# Patient Record
Sex: MALE | Race: WHITE | NOT HISPANIC OR LATINO
[De-identification: names, ages, dates, MRNs, and addresses within clinical notes are randomized per-mention and may not be internally consistent; named-entity substitution may affect disease eponyms.]

---

## 2020-08-22 ENCOUNTER — TRANSCRIPTION ENCOUNTER (OUTPATIENT)
Age: 65
End: 2020-08-22

## 2020-12-31 ENCOUNTER — TRANSCRIPTION ENCOUNTER (OUTPATIENT)
Age: 65
End: 2020-12-31

## 2021-01-08 ENCOUNTER — TRANSCRIPTION ENCOUNTER (OUTPATIENT)
Age: 66
End: 2021-01-08

## 2021-08-24 ENCOUNTER — INPATIENT (INPATIENT)
Facility: HOSPITAL | Age: 66
LOS: 0 days | Discharge: HOME | End: 2021-08-25
Attending: STUDENT IN AN ORGANIZED HEALTH CARE EDUCATION/TRAINING PROGRAM | Admitting: STUDENT IN AN ORGANIZED HEALTH CARE EDUCATION/TRAINING PROGRAM
Payer: MEDICAID

## 2021-08-24 VITALS
HEART RATE: 69 BPM | OXYGEN SATURATION: 99 % | DIASTOLIC BLOOD PRESSURE: 114 MMHG | RESPIRATION RATE: 18 BRPM | SYSTOLIC BLOOD PRESSURE: 183 MMHG | TEMPERATURE: 98 F

## 2021-08-24 DIAGNOSIS — N13.2 HYDRONEPHROSIS WITH RENAL AND URETERAL CALCULOUS OBSTRUCTION: ICD-10-CM

## 2021-08-24 LAB
ALBUMIN SERPL ELPH-MCNC: 4.7 G/DL — SIGNIFICANT CHANGE UP (ref 3.5–5.2)
ALP SERPL-CCNC: 115 U/L — SIGNIFICANT CHANGE UP (ref 30–115)
ALT FLD-CCNC: 19 U/L — SIGNIFICANT CHANGE UP (ref 0–41)
ANION GAP SERPL CALC-SCNC: 9 MMOL/L — SIGNIFICANT CHANGE UP (ref 7–14)
APPEARANCE UR: ABNORMAL
AST SERPL-CCNC: 20 U/L — SIGNIFICANT CHANGE UP (ref 0–41)
BACTERIA # UR AUTO: NEGATIVE — SIGNIFICANT CHANGE UP
BASOPHILS # BLD AUTO: 0.02 K/UL — SIGNIFICANT CHANGE UP (ref 0–0.2)
BASOPHILS NFR BLD AUTO: 0.3 % — SIGNIFICANT CHANGE UP (ref 0–1)
BILIRUB DIRECT SERPL-MCNC: <0.2 MG/DL — SIGNIFICANT CHANGE UP (ref 0–0.2)
BILIRUB INDIRECT FLD-MCNC: >0.1 MG/DL — LOW (ref 0.2–1.2)
BILIRUB SERPL-MCNC: 0.3 MG/DL — SIGNIFICANT CHANGE UP (ref 0.2–1.2)
BILIRUB UR-MCNC: NEGATIVE — SIGNIFICANT CHANGE UP
BUN SERPL-MCNC: 24 MG/DL — HIGH (ref 10–20)
CALCIUM SERPL-MCNC: 9.7 MG/DL — SIGNIFICANT CHANGE UP (ref 8.5–10.1)
CHLORIDE SERPL-SCNC: 106 MMOL/L — SIGNIFICANT CHANGE UP (ref 98–110)
CO2 SERPL-SCNC: 28 MMOL/L — SIGNIFICANT CHANGE UP (ref 17–32)
COLOR SPEC: YELLOW — SIGNIFICANT CHANGE UP
CREAT SERPL-MCNC: 1.1 MG/DL — SIGNIFICANT CHANGE UP (ref 0.7–1.5)
DIFF PNL FLD: ABNORMAL
EOSINOPHIL # BLD AUTO: 0.02 K/UL — SIGNIFICANT CHANGE UP (ref 0–0.7)
EOSINOPHIL NFR BLD AUTO: 0.3 % — SIGNIFICANT CHANGE UP (ref 0–8)
EPI CELLS # UR: 0 /HPF — SIGNIFICANT CHANGE UP (ref 0–5)
GLUCOSE SERPL-MCNC: 118 MG/DL — HIGH (ref 70–99)
GLUCOSE UR QL: NEGATIVE — SIGNIFICANT CHANGE UP
HCT VFR BLD CALC: 43.2 % — SIGNIFICANT CHANGE UP (ref 42–52)
HGB BLD-MCNC: 14.1 G/DL — SIGNIFICANT CHANGE UP (ref 14–18)
HYALINE CASTS # UR AUTO: 0 /LPF — SIGNIFICANT CHANGE UP (ref 0–7)
IMM GRANULOCYTES NFR BLD AUTO: 0.3 % — SIGNIFICANT CHANGE UP (ref 0.1–0.3)
KETONES UR-MCNC: NEGATIVE — SIGNIFICANT CHANGE UP
LACTATE SERPL-SCNC: 1.4 MMOL/L — SIGNIFICANT CHANGE UP (ref 0.7–2)
LEUKOCYTE ESTERASE UR-ACNC: NEGATIVE — SIGNIFICANT CHANGE UP
LIDOCAIN IGE QN: 31 U/L — SIGNIFICANT CHANGE UP (ref 7–60)
LYMPHOCYTES # BLD AUTO: 1.24 K/UL — SIGNIFICANT CHANGE UP (ref 1.2–3.4)
LYMPHOCYTES # BLD AUTO: 17.4 % — LOW (ref 20.5–51.1)
MCHC RBC-ENTMCNC: 29.7 PG — SIGNIFICANT CHANGE UP (ref 27–31)
MCHC RBC-ENTMCNC: 32.6 G/DL — SIGNIFICANT CHANGE UP (ref 32–37)
MCV RBC AUTO: 90.9 FL — SIGNIFICANT CHANGE UP (ref 80–94)
MONOCYTES # BLD AUTO: 0.39 K/UL — SIGNIFICANT CHANGE UP (ref 0.1–0.6)
MONOCYTES NFR BLD AUTO: 5.5 % — SIGNIFICANT CHANGE UP (ref 1.7–9.3)
NEUTROPHILS # BLD AUTO: 5.44 K/UL — SIGNIFICANT CHANGE UP (ref 1.4–6.5)
NEUTROPHILS NFR BLD AUTO: 76.2 % — HIGH (ref 42.2–75.2)
NITRITE UR-MCNC: NEGATIVE — SIGNIFICANT CHANGE UP
NRBC # BLD: 0 /100 WBCS — SIGNIFICANT CHANGE UP (ref 0–0)
PH UR: 7.5 — SIGNIFICANT CHANGE UP (ref 5–8)
PLATELET # BLD AUTO: 166 K/UL — SIGNIFICANT CHANGE UP (ref 130–400)
POTASSIUM SERPL-MCNC: 4.2 MMOL/L — SIGNIFICANT CHANGE UP (ref 3.5–5)
POTASSIUM SERPL-SCNC: 4.2 MMOL/L — SIGNIFICANT CHANGE UP (ref 3.5–5)
PROT SERPL-MCNC: 7.4 G/DL — SIGNIFICANT CHANGE UP (ref 6–8)
PROT UR-MCNC: SIGNIFICANT CHANGE UP
RBC # BLD: 4.75 M/UL — SIGNIFICANT CHANGE UP (ref 4.7–6.1)
RBC # FLD: 12.7 % — SIGNIFICANT CHANGE UP (ref 11.5–14.5)
RBC CASTS # UR COMP ASSIST: >720 /HPF — HIGH (ref 0–4)
SODIUM SERPL-SCNC: 143 MMOL/L — SIGNIFICANT CHANGE UP (ref 135–146)
SP GR SPEC: 1.02 — SIGNIFICANT CHANGE UP (ref 1.01–1.03)
UROBILINOGEN FLD QL: SIGNIFICANT CHANGE UP
WBC # BLD: 7.13 K/UL — SIGNIFICANT CHANGE UP (ref 4.8–10.8)
WBC # FLD AUTO: 7.13 K/UL — SIGNIFICANT CHANGE UP (ref 4.8–10.8)
WBC UR QL: 4 /HPF — SIGNIFICANT CHANGE UP (ref 0–5)

## 2021-08-24 PROCEDURE — 74177 CT ABD & PELVIS W/CONTRAST: CPT | Mod: 26,MA

## 2021-08-24 PROCEDURE — 99285 EMERGENCY DEPT VISIT HI MDM: CPT

## 2021-08-24 RX ORDER — MORPHINE SULFATE 50 MG/1
2 CAPSULE, EXTENDED RELEASE ORAL ONCE
Refills: 0 | Status: DISCONTINUED | OUTPATIENT
Start: 2021-08-24 | End: 2021-08-24

## 2021-08-24 RX ORDER — SODIUM CHLORIDE 9 MG/ML
1000 INJECTION INTRAMUSCULAR; INTRAVENOUS; SUBCUTANEOUS ONCE
Refills: 0 | Status: COMPLETED | OUTPATIENT
Start: 2021-08-24 | End: 2021-08-24

## 2021-08-24 RX ORDER — TAMSULOSIN HYDROCHLORIDE 0.4 MG/1
0.4 CAPSULE ORAL ONCE
Refills: 0 | Status: COMPLETED | OUTPATIENT
Start: 2021-08-24 | End: 2021-08-24

## 2021-08-24 RX ORDER — KETOROLAC TROMETHAMINE 30 MG/ML
15 SYRINGE (ML) INJECTION ONCE
Refills: 0 | Status: DISCONTINUED | OUTPATIENT
Start: 2021-08-24 | End: 2021-08-24

## 2021-08-24 RX ORDER — SODIUM CHLORIDE 9 MG/ML
1000 INJECTION, SOLUTION INTRAVENOUS ONCE
Refills: 0 | Status: COMPLETED | OUTPATIENT
Start: 2021-08-24 | End: 2021-08-24

## 2021-08-24 RX ORDER — TAMSULOSIN HYDROCHLORIDE 0.4 MG/1
0.4 CAPSULE ORAL AT BEDTIME
Refills: 0 | Status: DISCONTINUED | OUTPATIENT
Start: 2021-08-24 | End: 2021-08-25

## 2021-08-24 RX ORDER — ONDANSETRON 8 MG/1
4 TABLET, FILM COATED ORAL ONCE
Refills: 0 | Status: COMPLETED | OUTPATIENT
Start: 2021-08-24 | End: 2021-08-24

## 2021-08-24 RX ORDER — MORPHINE SULFATE 50 MG/1
4 CAPSULE, EXTENDED RELEASE ORAL ONCE
Refills: 0 | Status: DISCONTINUED | OUTPATIENT
Start: 2021-08-24 | End: 2021-08-24

## 2021-08-24 RX ADMIN — ONDANSETRON 4 MILLIGRAM(S): 8 TABLET, FILM COATED ORAL at 18:18

## 2021-08-24 RX ADMIN — SODIUM CHLORIDE 1000 MILLILITER(S): 9 INJECTION INTRAMUSCULAR; INTRAVENOUS; SUBCUTANEOUS at 22:33

## 2021-08-24 RX ADMIN — MORPHINE SULFATE 4 MILLIGRAM(S): 50 CAPSULE, EXTENDED RELEASE ORAL at 18:18

## 2021-08-24 RX ADMIN — Medication 15 MILLIGRAM(S): at 20:57

## 2021-08-24 RX ADMIN — SODIUM CHLORIDE 1000 MILLILITER(S): 9 INJECTION, SOLUTION INTRAVENOUS at 18:17

## 2021-08-24 RX ADMIN — TAMSULOSIN HYDROCHLORIDE 0.4 MILLIGRAM(S): 0.4 CAPSULE ORAL at 22:33

## 2021-08-24 NOTE — ED PROVIDER NOTE - CLINICAL SUMMARY MEDICAL DECISION MAKING FREE TEXT BOX
67 yo male with PMH of HTN presents to the ER for left flank pain x 4 days. History from son Jake Gonzales (translating for pt). Pt states pain radiates to the LLQ, and is associated with nausea/vomiting, urinary urgency. Had an outpatient XRAY done which showed possible kidney stone on the left. Pt therefore came in with family for evaluation of kidney stone. Pt states no diarrhea/cp/sob/fever/rash/cough/sneezing. On exam ncat, perrl, eomi, lung ctab, heart regular s1s2 , abdomen soft nd, +LLQ tenderness, +left flank tenderness, +BS, no mass, Ext no edema or calf tenderness, neuro intact. Given IVFs, and pain meds. Labs, urine and CT checked. Found to have large blood in urine and CT showed 5x8 mm mid ureteral calculus, moderate hydro. Consulted Urology given large size of stone and intractable pain. Urology recommends admission to medicine for clearance, and likely procedure for tomorrow.

## 2021-08-24 NOTE — ED PROVIDER NOTE - PHYSICAL EXAMINATION
CONSTITUTIONAL: Well-developed; well-nourished; in no acute distress, nontoxic appearing  SKIN: skin exam is warm and dry  ENT: MMM  NECK: ROM intact.  CARD: S1, S2 normal, no murmur  RESP: No wheezes, rales or rhonchi. Good air movement bilaterally  ABD: +LLQ, no rebound/guarding. No CVAT   EXT: Normal ROM.   NEURO: awake, alert, following commands, oriented, grossly unremarkable. No Focal deficits. GCS 15.   PSYCH: Cooperative, appropriate.

## 2021-08-24 NOTE — ED PROVIDER NOTE - OBJECTIVE STATEMENT
66 year old male, past medical history htn, kidney stones, who presents with flank pain. patient reports intermittent l sided flank pain radiating to LLQ x3 days, followed up outpatient at pmd, had XR abdomen concerning for stone sent to ed for further eval. reports nausea/vomiting, urinary urgency. denies fever, chills, chest pain, SOB, bowel changes. no hx abd surgeries.

## 2021-08-24 NOTE — ED ADULT NURSE NOTE - OBJECTIVE STATEMENT
Patient present to ED with complains of left flank pain x 3 days. Patient has hx of kidney stones a long time ago as per son. Denies fevers chills, vomiting, diarrhea, chest pain and SOB.

## 2021-08-24 NOTE — ED ADULT NURSE NOTE - BREATHING, MLM
Spontaneous, unlabored and symmetrical Island Pedicle Flap Text: The defect edges were debeveled with a #15 scalpel blade.  Given the location of the defect, shape of the defect and the proximity to free margins an island pedicle advancement flap was deemed most appropriate.  Using a sterile surgical marker, an appropriate advancement flap was drawn incorporating the defect, outlining the appropriate donor tissue and placing the expected incisions within the relaxed skin tension lines where possible.    The area thus outlined was incised deep to adipose tissue with a #15 scalpel blade.  The skin margins were undermined to an appropriate distance in all directions around the primary defect and laterally outward around the island pedicle utilizing iris scissors.  There was minimal undermining beneath the pedicle flap.

## 2021-08-24 NOTE — ED PROVIDER NOTE - NS ED ROS FT
Review of Systems:  	•	CONSTITUTIONAL: no fever  	•	SKIN: no rash  	•	ENT: no sore throat  	•	RESPIRATORY: no shortness of breath, no cough  	•	CARDIAC: no chest pain, no palpitations  	•	GI: +L flank pain, +nausea/vomiting. no bowel changes  	•	GENITO-URINARY: no discharge, no dysuria; no hematuria, +urgency   	•	MUSCULOSKELETAL: no joint paint, no swelling, no redness  	•	NEUROLOGIC: no weakness, no headache  	•	PSYCH: no anxiety, non suicidal, non homicidal, no hallucination, no depression

## 2021-08-24 NOTE — ED PROVIDER NOTE - PROGRESS NOTE DETAILS
Maritza: seen by Urology team. The patient willing to stay overnight for urology procedure to be done tomorrow to assist with intractable pain from large mid ureter stone. Pt to be admitted to medicine for clearance prior to procedure.

## 2021-08-24 NOTE — CONSULT NOTE ADULT - PROBLEM SELECTOR RECOMMENDATION 9
Pt. and wife at bedside, was informed of all his treatment options, either conservative tx with analgesia/flomax or surgical intervention via cystoscopy, laser and stent placement.  Pt states he prefers to have procedure performed since pain will likely return.  Spoke with Urology Attending who will place on OR schedule for tomorrow if pt is medically cleared and ready to go to the OR.  ED Attending aware and will admit to medical svc    NPO after midnight tonight  Medical clearance and optimization for Cystoscopy, ureteroscopy and laser with JJ stent tomorrow as an add on  Continue IV hydration  Strain all urine  Send urine for Culture and Sensitivity  Flomax  Analgesia  Will Book for OR tomorrow as Add on

## 2021-08-24 NOTE — CONSULT NOTE ADULT - ATTENDING COMMENTS
Pt seen and examined 8/25/21 in the afternoon, ct ap images visualized demonstrating left hydroureteronephrosis  to the level of an 8 mm ureteral stone. pt has felt well all day and we discussed options with the patient. since he is feeling better he would prefer trial of passage. understands that if he has f/c he needs to return immediately. he knows its a larger stone and may not pass it and require additional procedure. though it is reasonable to have a trial of passage as sx improved, and he has passed stones in the past.  flomax   toradol  appt already made w my office

## 2021-08-24 NOTE — ED PROVIDER NOTE - ATTENDING CONTRIBUTION TO CARE
67 yo male with PMH of HTN presents to the ER for left flank pain x 4 days. History from son Jake Gonzales (translating for pt). Pt states pain radiates to the LLQ, and is associated with nausea/vomiting, urinary urgency. Had an outpatient XRAY done which showed possible kidney stone on the left. Pt therefore came in with family for evaluation of kidney stone. Pt states no diarrhea/cp/sob/fever/rash/cough/sneezing. On exam ncat, perrl, eomi, lung ctab, heart regular s1s2 , abdomen soft nd, +LLQ tenderness, +left flank tenderness, +BS, no mass, Ext no edema or calf tenderness, neuro intact. Given IVFs, and pain meds. Labs, urine and CT checked. Found to have large blood in urine and CT showed 5x8 mm mid ureteral calculus, moderate hydro. Consulted Urology given large size of stone and intractable pain. Urology recommends admission to medicine for clearance, and likely procedure for tomorrow.     ALL: nkda  PMH as above  Meds Exforge 160-5, no blood thinners  SH no smoker, +etoh occ  PMD denies  Covid vaccinated with 2 shot brand  swab neg yesterday for covid

## 2021-08-24 NOTE — CONSULT NOTE ADULT - SUBJECTIVE AND OBJECTIVE BOX
Pt is 67 yo male presents with 3 days hx of left sided flank pain assosciated with nausea and vomit x 1.  Pt saw his PMD yesterday and was given pain medication and a script for xray of abdomen which pt did not have opportunity to complete.  Pt states pain was 10/10 but now after getting Toradol and morphine it is tolerable.  Pt denies any fever, chills or dysuria.  Pt states he had episode of kidney stones 30 yrs ago in [place] in which they apparently passed on their own.      PAST MEDICAL HX    Hypertension    MEDS:  Losartan    SOCIAL HX: Denies any illicit drug use    NKDA      REVIEW OF SYSTEMS:    CONSTITUTIONAL:  fevers or chills  HEENT: No visual changes  ENDO: No sweating  NECK: No pain or stiffness  MUSCULOSKELETAL: No back pain, no joint pain  RESPIRATORY: No shortness of breath  CARDIOVASCULAR: No chest pain  GASTROINTESTINAL: No abdominal or epigastric pain. No nausea, vomiting,  No diarrhea or constipation.   NEUROLOGICAL: No mental status changes  PSYCH: No depression, no mood changes  SKIN: No itching      MEDICATIONS  (STANDING):  sodium chloride 0.9% Bolus 1000 milliLiter(s) IV Bolus once  tamsulosin 0.4 milliGRAM(s) Oral at bedtime  tamsulosin 0.4 milliGRAM(s) Oral once                    Vital Signs Last 24 Hrs  T(C): 36.5 (24 Aug 2021 17:44), Max: 36.5 (24 Aug 2021 17:44)  T(F): 97.7 (24 Aug 2021 17:44), Max: 97.7 (24 Aug 2021 17:44)  HR: 69 (24 Aug 2021 17:44) (69 - 69)  BP: 183/114 (24 Aug 2021 17:44) (183/114 - 183/114)  BP(mean): --  RR: 18 (24 Aug 2021 17:44) (18 - 18)  SpO2: 99% (24 Aug 2021 17:44) (99% - 99%)    PHYSICAL EXAM:    Constitutional: NAD, well-developed, pain controlled at this time  HEENT: EOMI  Neck: no pain  Back: Mild Left sided CVA tenderness  Respiratory: No accessory respiratory muscle use  Abd: Soft, NT/ND  no organomegally  no hernia  : No suprapubic fullness or tenderness, no scrotal edema or tenderness  Extremities: no edema  Neurological: A/O x 3  Psychiatric: Normal mood, normal affect  Skin: No rashes      LABS:                        14.1   7.13  )-----------( 166      ( 24 Aug 2021 18:21 )             43.2         143  |  106  |  24<H>  ----------------------------<  118<H>  4.2   |  28  |  1.1    Ca    9.7      24 Aug 2021 18:21    TPro  7.4  /  Alb  4.7  /  TBili  0.3  /  DBili  <0.2  /  AST  20  /  ALT  19  /  AlkPhos  115        Urinalysis Basic - ( 24 Aug 2021 18:33 )    Color: Yellow / Appearance: Slightly Turbid / S.019 / pH: x  Gluc: x / Ketone: Negative  / Bili: Negative / Urobili: <2 mg/dL   Blood: x / Protein: Trace / Nitrite: Negative   Leuk Esterase: Negative / RBC: >720 /HPF / WBC 4 /HPF   Sq Epi: x / Non Sq Epi: 0 /HPF / Bacteria: Negative            RADIOLOGY & ADDITIONAL STUDIES:  < from: CT Abdomen and Pelvis w/ IV Cont (21 @ 21:07) >    EXAM:  CT ABDOMEN AND PELVIS IC            PROCEDURE DATE:  2021            INTERPRETATION:  CLINICAL STATEMENT: Left flank pain, nausea and vomiting.    TECHNIQUE: Contiguous axial CT images were obtained from the lower chest to the pubic symphysis after the administration of 100 cc Omnipaque 350 IV contrast.  Oral contrast was not administered.  Reformatted images in the coronal and sagittal planes were acquired.    COMPARISON CT: None.    FINDINGS:    LOWER CHEST: Bibasilar atelectaticchanges.    HEPATOBILIARY: Unremarkable.    SPLEEN: Unremarkable.    PANCREAS: Unremarkable.    ADRENAL GLANDS: Thickening of the left adrenal gland. Unremarkable right adrenal gland..    KIDNEYS: Delayed left nephrogram with moderate hydroureteronephrosis and perinephric/periureteral fat stranding to the level of an obstructing mid ureteral calculus measuring approximately 5 x 5 x 8 mm and 1260 HU. Partial duplication of the right collecting system. No right-sided hydronephrosis.    ABDOMINOPELVIC NODES: No enlarged abdominal or pelvic lymph nodes.    PELVIC ORGANS: Mild prostatomegaly.    PERITONEUM/MESENTERY/BOWEL: No intraperitoneal free air, ascites, or bowel obstruction. Normal appendix.    BONES/SOFT TISSUES: Benign-appearing bone islands, the largest within the left sacrum (4-2 80). No aggressive osseous lesions or destructive soft tissue processes.    OTHER: Minimal atherosclerosis of the aorta. Origins of the abdominal aortic branches are patent.    IMPRESSION:    Delayed left nephrogram with moderate hydroureteronephrosis and perinephric/periureteral fat stranding to the level of an obstructing mid ureteral calculus measuring approximately 5 x 5 x 8 mm and 1260 HU.    Nonspecific thickening of the left adrenal gland.    ---End of Report ---            JANY KIM MD; Resident Radiologist  This document has been electronically signed.  CAROLE WILLAMS MD; Attending Interventional Radiologist  This document has been electronically signed. Aug 24 2021 10:00PM    < end of copied text >

## 2021-08-25 ENCOUNTER — TRANSCRIPTION ENCOUNTER (OUTPATIENT)
Age: 66
End: 2021-08-25

## 2021-08-25 VITALS
TEMPERATURE: 97 F | DIASTOLIC BLOOD PRESSURE: 71 MMHG | RESPIRATION RATE: 19 BRPM | SYSTOLIC BLOOD PRESSURE: 144 MMHG | HEART RATE: 69 BPM

## 2021-08-25 LAB
ABO RH CONFIRMATION: SIGNIFICANT CHANGE UP
ALBUMIN SERPL ELPH-MCNC: 4.2 G/DL — SIGNIFICANT CHANGE UP (ref 3.5–5.2)
ALP SERPL-CCNC: 106 U/L — SIGNIFICANT CHANGE UP (ref 30–115)
ALT FLD-CCNC: 15 U/L — SIGNIFICANT CHANGE UP (ref 0–41)
ANION GAP SERPL CALC-SCNC: 9 MMOL/L — SIGNIFICANT CHANGE UP (ref 7–14)
APTT BLD: 32.3 SEC — SIGNIFICANT CHANGE UP (ref 27–39.2)
AST SERPL-CCNC: 19 U/L — SIGNIFICANT CHANGE UP (ref 0–41)
BASOPHILS # BLD AUTO: 0.02 K/UL — SIGNIFICANT CHANGE UP (ref 0–0.2)
BASOPHILS NFR BLD AUTO: 0.3 % — SIGNIFICANT CHANGE UP (ref 0–1)
BILIRUB SERPL-MCNC: 0.4 MG/DL — SIGNIFICANT CHANGE UP (ref 0.2–1.2)
BUN SERPL-MCNC: 19 MG/DL — SIGNIFICANT CHANGE UP (ref 10–20)
CALCIUM SERPL-MCNC: 9 MG/DL — SIGNIFICANT CHANGE UP (ref 8.5–10.1)
CHLORIDE SERPL-SCNC: 104 MMOL/L — SIGNIFICANT CHANGE UP (ref 98–110)
CO2 SERPL-SCNC: 28 MMOL/L — SIGNIFICANT CHANGE UP (ref 17–32)
COVID-19 SPIKE DOMAIN AB INTERP: POSITIVE
COVID-19 SPIKE DOMAIN ANTIBODY RESULT: >250 U/ML — HIGH
CREAT SERPL-MCNC: 1.2 MG/DL — SIGNIFICANT CHANGE UP (ref 0.7–1.5)
CULTURE RESULTS: SIGNIFICANT CHANGE UP
EOSINOPHIL # BLD AUTO: 0.06 K/UL — SIGNIFICANT CHANGE UP (ref 0–0.7)
EOSINOPHIL NFR BLD AUTO: 0.9 % — SIGNIFICANT CHANGE UP (ref 0–8)
GLUCOSE SERPL-MCNC: 95 MG/DL — SIGNIFICANT CHANGE UP (ref 70–99)
HCT VFR BLD CALC: 41.2 % — LOW (ref 42–52)
HGB BLD-MCNC: 13.6 G/DL — LOW (ref 14–18)
IMM GRANULOCYTES NFR BLD AUTO: 0 % — LOW (ref 0.1–0.3)
INR BLD: 1.07 RATIO — SIGNIFICANT CHANGE UP (ref 0.65–1.3)
LYMPHOCYTES # BLD AUTO: 1.44 K/UL — SIGNIFICANT CHANGE UP (ref 1.2–3.4)
LYMPHOCYTES # BLD AUTO: 21.1 % — SIGNIFICANT CHANGE UP (ref 20.5–51.1)
MAGNESIUM SERPL-MCNC: 2.1 MG/DL — SIGNIFICANT CHANGE UP (ref 1.8–2.4)
MCHC RBC-ENTMCNC: 30.1 PG — SIGNIFICANT CHANGE UP (ref 27–31)
MCHC RBC-ENTMCNC: 33 G/DL — SIGNIFICANT CHANGE UP (ref 32–37)
MCV RBC AUTO: 91.2 FL — SIGNIFICANT CHANGE UP (ref 80–94)
MONOCYTES # BLD AUTO: 0.65 K/UL — HIGH (ref 0.1–0.6)
MONOCYTES NFR BLD AUTO: 9.5 % — HIGH (ref 1.7–9.3)
NEUTROPHILS # BLD AUTO: 4.67 K/UL — SIGNIFICANT CHANGE UP (ref 1.4–6.5)
NEUTROPHILS NFR BLD AUTO: 68.2 % — SIGNIFICANT CHANGE UP (ref 42.2–75.2)
NRBC # BLD: 0 /100 WBCS — SIGNIFICANT CHANGE UP (ref 0–0)
PLATELET # BLD AUTO: 142 K/UL — SIGNIFICANT CHANGE UP (ref 130–400)
POTASSIUM SERPL-MCNC: 3.9 MMOL/L — SIGNIFICANT CHANGE UP (ref 3.5–5)
POTASSIUM SERPL-SCNC: 3.9 MMOL/L — SIGNIFICANT CHANGE UP (ref 3.5–5)
PROT SERPL-MCNC: 6.5 G/DL — SIGNIFICANT CHANGE UP (ref 6–8)
PROTHROM AB SERPL-ACNC: 12.3 SEC — SIGNIFICANT CHANGE UP (ref 9.95–12.87)
RBC # BLD: 4.52 M/UL — LOW (ref 4.7–6.1)
RBC # FLD: 12.5 % — SIGNIFICANT CHANGE UP (ref 11.5–14.5)
SARS-COV-2 IGG+IGM SERPL QL IA: >250 U/ML — HIGH
SARS-COV-2 IGG+IGM SERPL QL IA: POSITIVE
SARS-COV-2 RNA SPEC QL NAA+PROBE: SIGNIFICANT CHANGE UP
SODIUM SERPL-SCNC: 141 MMOL/L — SIGNIFICANT CHANGE UP (ref 135–146)
SPECIMEN SOURCE: SIGNIFICANT CHANGE UP
WBC # BLD: 6.84 K/UL — SIGNIFICANT CHANGE UP (ref 4.8–10.8)
WBC # FLD AUTO: 6.84 K/UL — SIGNIFICANT CHANGE UP (ref 4.8–10.8)

## 2021-08-25 PROCEDURE — 99222 1ST HOSP IP/OBS MODERATE 55: CPT

## 2021-08-25 PROCEDURE — 99223 1ST HOSP IP/OBS HIGH 75: CPT

## 2021-08-25 PROCEDURE — 93010 ELECTROCARDIOGRAM REPORT: CPT

## 2021-08-25 PROCEDURE — 71045 X-RAY EXAM CHEST 1 VIEW: CPT | Mod: 26

## 2021-08-25 RX ORDER — TAMSULOSIN HYDROCHLORIDE 0.4 MG/1
1 CAPSULE ORAL
Qty: 28 | Refills: 0
Start: 2021-08-25 | End: 2021-09-21

## 2021-08-25 RX ORDER — SODIUM CHLORIDE 9 MG/ML
1000 INJECTION, SOLUTION INTRAVENOUS
Refills: 0 | Status: DISCONTINUED | OUTPATIENT
Start: 2021-08-25 | End: 2021-08-25

## 2021-08-25 RX ORDER — AMLODIPINE BESYLATE 2.5 MG/1
5 TABLET ORAL DAILY
Refills: 0 | Status: DISCONTINUED | OUTPATIENT
Start: 2021-08-25 | End: 2021-08-25

## 2021-08-25 RX ORDER — HEPARIN SODIUM 5000 [USP'U]/ML
5000 INJECTION INTRAVENOUS; SUBCUTANEOUS EVERY 8 HOURS
Refills: 0 | Status: DISCONTINUED | OUTPATIENT
Start: 2021-08-25 | End: 2021-08-25

## 2021-08-25 RX ORDER — KETOROLAC TROMETHAMINE 30 MG/ML
1 SYRINGE (ML) INJECTION
Qty: 28 | Refills: 0
Start: 2021-08-25 | End: 2021-08-31

## 2021-08-25 RX ORDER — ONDANSETRON 8 MG/1
4 TABLET, FILM COATED ORAL EVERY 6 HOURS
Refills: 0 | Status: DISCONTINUED | OUTPATIENT
Start: 2021-08-25 | End: 2021-08-25

## 2021-08-25 RX ORDER — AMLODIPINE VALSARTAN AND HYDROCHLOROTHIAZIDE 10; 160; 25 MG/1; MG/1; MG/1
1 TABLET, FILM COATED ORAL
Qty: 0 | Refills: 0 | DISCHARGE

## 2021-08-25 RX ORDER — MORPHINE SULFATE 50 MG/1
4 CAPSULE, EXTENDED RELEASE ORAL EVERY 6 HOURS
Refills: 0 | Status: DISCONTINUED | OUTPATIENT
Start: 2021-08-25 | End: 2021-08-25

## 2021-08-25 RX ADMIN — HEPARIN SODIUM 5000 UNIT(S): 5000 INJECTION INTRAVENOUS; SUBCUTANEOUS at 05:19

## 2021-08-25 RX ADMIN — HEPARIN SODIUM 5000 UNIT(S): 5000 INJECTION INTRAVENOUS; SUBCUTANEOUS at 13:06

## 2021-08-25 RX ADMIN — SODIUM CHLORIDE 100 MILLILITER(S): 9 INJECTION, SOLUTION INTRAVENOUS at 06:00

## 2021-08-25 RX ADMIN — AMLODIPINE BESYLATE 5 MILLIGRAM(S): 2.5 TABLET ORAL at 05:46

## 2021-08-25 RX ADMIN — ONDANSETRON 4 MILLIGRAM(S): 8 TABLET, FILM COATED ORAL at 05:19

## 2021-08-25 NOTE — H&P ADULT - HISTORY OF PRESENT ILLNESS
66 year old M with PMHx of HTN, kidney stones about 30 yrs ago that passed on their own. Pt presents to the ED with flank pain. Pt reports intermittent L sided flank pain radiating to LLQ x3 days, had Xray performed o/p - result concerning for stone. Pt was sent to the ED by his PMD for further eval. Endorses nausea/vomiting, urinary urgency. Denies fever, chills, chest pain, SOB, bowel changes.     In the ED, pt was hemodynamically stable. Labs wnl. Urinalysis positive for blood/RBCs.    CT abdomen pelvis Delayed left nephrogram with moderate hydroureteronephrosis and perinephric/periureteral fat stranding to the level of an obstructing mid ureteral calculus measuring approximately 5 x 5 x 8 mm and 1260 HU. Nonspecific thickening of the left adrenal gland.    Urology evaluated pt. Pt admitted for surgical management.

## 2021-08-25 NOTE — DISCHARGE NOTE PROVIDER - NSDCCPCAREPLAN_GEN_ALL_CORE_FT
PRINCIPAL DISCHARGE DIAGNOSIS  Diagnosis: Hydronephrosis with renal and ureteral calculous obstruction  Assessment and Plan of Treatment: You are admitted to hospital with L sided flank pain. CT scan revealed stones in your ureter measuring 5 x 5 x 8 mm along with moderate kidney enlargement. You were scheduled for surgery by urology but you refused it. So you were discharged on pain meds and medication that helps you to pass the stone out. Please follow up with urology outpatient

## 2021-08-25 NOTE — PROGRESS NOTE ADULT - ASSESSMENT
65 y/o Male with left hydronephrosis 2/2 a mid ureteral stone.    A) Left hydronephrosis 2/2 a mid ureteral stone 5 x 5 x 8 mm    P) Keep NPO  For OR later today  awaiting medical clearance, spoke with medical attending  will d/w attending

## 2021-08-25 NOTE — DISCHARGE NOTE PROVIDER - CARE PROVIDER_API CALL
Wesley De Jesus)  Urology  [address], [address]  [place] [address]  Phone: [phone]  Fax: [phone]  Follow Up Time: 1 week    Ashley Stanton)  Internal Medicine  [address], [address] [place] [address]  Phone: [phone]  Fax: [phone]  Follow Up Time: 1 week

## 2021-08-25 NOTE — DISCHARGE NOTE PROVIDER - NSDCMRMEDTOKEN_GEN_ALL_CORE_FT
amlodipine/valsartan/hydrochlorothiazide 5 mg-160 mg-12.5 mg oral tablet: 1 tab(s) orally once a day  ketorolac 10 mg oral tablet: 1 tab(s) orally 4 times a day   tamsulosin 0.4 mg oral capsule: 1 cap(s) orally once a day (at bedtime)

## 2021-08-25 NOTE — H&P ADULT - ATTENDING COMMENTS
patient seen and examined , agree with pgy 3 assesment and plan except as indicated above,     66y male presented with Left sided flank pain found to have nephrolithiasis     GEN Lying in no acute distress  HEENT Pupils equal and reactive to light and accommodationSupple Neck  PULM Clear to auscultation bilaterally  CV s1s2 regular rate and rhythm  GI + bowel sounds nontnender, L cva tenderness  EXT no cyanosis or edema  PSYCH awake alert and oriented x 3  INTEG No Lesions  NEURO Moves all extremities    #Nephrolithasis with Left hydronephrosis 2/2 a mid ureteral stone 5 x 5 x 8 mm  per golman criteria patient is a class 1 , thus is low- mod for mod risk procedure     #Sinus bradycardia no intervention , subsequent vitals resolved     #CKD 2     PROGRESS NOTE HANDOFF    Pending:  cystoscopy today     Family discussion: patient verbalized understanding and agreeable to plan of care     Disposition: Home

## 2021-08-25 NOTE — H&P ADULT - NSHPPHYSICALEXAM_GEN_ALL_CORE
GENERAL: NAD, speaks in full sentences, no signs of respiratory distress  NECK: Supple, No JVD  CHEST/LUNG: Clear to auscultation bilaterally; No wheeze; No crackles; No accessory muscles used  HEART: Regular rate and rhythm; No murmurs;   ABDOMEN: Soft, Nontender, Nondistended; Bowel sounds present; No guarding  EXTREMITIES:  2+ Peripheral Pulses, No cyanosis or edema  PSYCH: AAOx3  NEUROLOGY: non-focal

## 2021-08-25 NOTE — H&P ADULT - ASSESSMENT
66 year old M with PMHx of HTN, kidney stones about 30 yrs ago that passed on their own. Pt presents to the ED with flank pain. Pt reports intermittent L sided flank pain radiating to LLQ x3 days, had Xray performed o/p - result concerning for stone. Pt was sent to the ED by his PMD for further eval. Endorses nausea/vomiting, urinary urgency. Denies fever, chills, chest pain, SOB, bowel changes.     #Hydronephrosis with renal and ureteral calculus obstruction  no sepsis POA   CT abdomen pelvis Delayed left nephrogram with moderate hydroureteronephrosis and perinephric/periureteral fat stranding to the level of an obstructing mid ureteral calculus measuring approximately 5 x 5 x 8 mm and 1260 HU. Nonspecific thickening of the left adrenal gland  Urology on board - OR for Cystoscopy, ureteroscopy and laser with JJ stent in AM  NPO after midnight   Medical clearance   Continue IV hydration  Strain all urine  f/u Ucx   start Flomax  pain control PRN     #HTN  BP elevated likely secondary to pain vs medication interruption  cont home meds    #DVT PPX : Heparin Subq  #Diet:DASH/TLC  #GI PPX: Not indicated  #Activity:IAT  FULL CODE      66 year old M with PMHx of HTN, kidney stones about 30 yrs ago that passed on their own. Pt presents to the ED with flank pain. Pt reports intermittent L sided flank pain radiating to LLQ x3 days, had Xray performed o/p - result concerning for stone. Pt was sent to the ED by his PMD for further eval. Endorses nausea/vomiting, urinary urgency. Denies fever, chills, chest pain, SOB, bowel changes.     #Hydronephrosis with renal and ureteral calculus obstruction  no sepsis POA   CT abdomen pelvis Delayed left nephrogram with moderate hydroureteronephrosis and perinephric/periureteral fat stranding to the level of an obstructing mid ureteral calculus measuring approximately 5 x 5 x 8 mm and 1260 HU. Nonspecific thickening of the left adrenal gland  Urology on board - OR for Cystoscopy, ureteroscopy and laser with JJ stent in AM  NPO after midnight   Medical clearance   Continue IV hydration  Strain all urine  f/u Ucx   start Flomax  pain control PRN     #HTN  BP elevated likely secondary to pain vs medication interruption  cont amlodipine   valsartan and HCTZ held - mildly elevated Cr 1.1 unsure of baseline     #DVT PPX : Heparin Subq  #Diet:DASH/TLC  #GI PPX: Not indicated  #Activity:IAT  FULL CODE

## 2021-08-25 NOTE — DISCHARGE NOTE PROVIDER - PROVIDER TOKENS
PROVIDER:[TOKEN:[[id number]],FOLLOWUP:[1 week]],PROVIDER:[TOKEN:[[id number]],FOLLOWUP:[1 week]]

## 2021-08-25 NOTE — PROGRESS NOTE ADULT - SUBJECTIVE AND OBJECTIVE BOX
Progress Note    Subjective Pt seen and examined at bedside   67 y/o Male with left hydronephrosis 2/2 a mid ureteral stone.  Pt doing better less pain this am, but still with an ache.    [ x ] a 10 point review of systems was negative except where noted    Vital signs  T(C): , Max: 36.8 (08-25-21 @ 04:39)  HR: 67 (08-25-21 @ 07:09)  BP: 123/76 (08-25-21 @ 07:09)  SpO2: 97% (08-25-21 @ 04:39)    Gen NC/AT in NAD  SKIN warm, dry with good tugor  Neck supple, FROM  LUNGS No dyspnea, No accessory muscle use  BACK Mild left CVAT  ABD    Soft non tender, bladder not palpable   voiding freely      Labs                        13.6   6.84  )-----------( 142      ( 25 Aug 2021 06:10 )             41.2     25 Aug 2021 06:10    141    |  104    |  19     ----------------------------<  95     3.9     |  28     |  1.2      Ca    9.0        25 Aug 2021 06:10  Mg     2.1       25 Aug 2021 06:10

## 2021-08-25 NOTE — DISCHARGE NOTE PROVIDER - HOSPITAL COURSE
66 year old M with PMHx of HTN, kidney stones about 30 yrs ago that passed on their own. Pt presents to the ED with flank pain. Pt reports intermittent L sided flank pain radiating to LLQ x3 days, had Xray performed o/p - result concerning for stone. Pt was sent to the ED by his PMD for further eval. Endorses nausea/vomiting, urinary urgency. Denies fever, chills, chest pain, SOB, bowel changes.     In the ED, pt was hemodynamically stable. Labs wnl. Urinalysis positive for blood/RBCs.    Urology evaluated pt. Pt admitted for surgical management.    HOSPITAL COURSE SIGNIFICANT FOR THE FOLLOWING:  #Hydronephrosis with renal and ureteral calculus obstruction  no sepsis POA   CT abdomen pelvis Delayed left nephrogram with moderate hydroureteronephrosis and perinephric/periureteral fat stranding to the level of an obstructing mid ureteral calculus measuring approximately 5 x 5 x 8 mm and 1260 HU. Nonspecific thickening of the left adrenal gland  Urology on board - OR for Cystoscopy, ureteroscopy and laser with JJ stent in AM  Continue IV hydration  Strain all urine  start Flomax  pain control PRN   Pt refused surgery this afternoon    #HTN  BP elevated likely secondary to pain vs medication interruption  cont amlodipine   valsartan and HCTZ held - mildly elevated Cr 1.1 unsure of baseline

## 2021-08-25 NOTE — DISCHARGE NOTE NURSING/CASE MANAGEMENT/SOCIAL WORK - NSDCPEFALRISK_GEN_ALL_CORE
For information on Fall & injury Prevention, visit [email or web address]

## 2021-08-25 NOTE — DISCHARGE NOTE PROVIDER - CARE PROVIDERS DIRECT ADDRESSES
[email or web address]

## 2021-08-25 NOTE — H&P ADULT - NSHPLABSRESULTS_GEN_ALL_CORE
14.1   7.13  )-----------( 166      ( 24 Aug 2021 18:21 )             43.2           143  |  106  |  24<H>  ----------------------------<  118<H>  4.2   |  28  |  1.1    Ca    9.7      24 Aug 2021 18:21    TPro  7.4  /  Alb  4.7  /  TBili  0.3  /  DBili  <0.2  /  AST  20  /  ALT  19  /  AlkPhos  115                Urinalysis Basic - ( 24 Aug 2021 18:33 )    Color: Yellow / Appearance: Slightly Turbid / S.019 / pH: x  Gluc: x / Ketone: Negative  / Bili: Negative / Urobili: <2 mg/dL   Blood: x / Protein: Trace / Nitrite: Negative   Leuk Esterase: Negative / RBC: >720 /HPF / WBC 4 /HPF   Sq Epi: x / Non Sq Epi: 0 /HPF / Bacteria: Negative            Lactate Trend   @ 18:21 Lactate:1.4             CAPILLARY BLOOD GLUCOSE

## 2021-08-25 NOTE — DISCHARGE NOTE NURSING/CASE MANAGEMENT/SOCIAL WORK - PATIENT PORTAL LINK FT
You can access the FollowMyHealth Patient Portal offered by [facility] by registering at the following website: [email or web address]. By joining [employer]’s FollowMyHealth portal, you will also be able to view your health information using other applications (apps) compatible with our system.

## 2021-08-26 RX ORDER — KETOROLAC TROMETHAMINE 30 MG/ML
1 SYRINGE (ML) INJECTION
Qty: 28 | Refills: 0
Start: 2021-08-26 | End: 2021-09-01

## 2021-08-26 RX ORDER — TAMSULOSIN HYDROCHLORIDE 0.4 MG/1
1 CAPSULE ORAL
Qty: 28 | Refills: 0
Start: 2021-08-26 | End: 2021-09-22

## 2021-08-30 PROBLEM — Z00.00 ENCOUNTER FOR PREVENTIVE HEALTH EXAMINATION: Status: ACTIVE | Noted: 2021-08-30

## 2021-08-31 ENCOUNTER — APPOINTMENT (OUTPATIENT)
Dept: UROLOGY | Facility: CLINIC | Age: 66
End: 2021-08-31

## 2021-09-08 DIAGNOSIS — N13.2 HYDRONEPHROSIS WITH RENAL AND URETERAL CALCULOUS OBSTRUCTION: ICD-10-CM

## 2021-09-08 DIAGNOSIS — Z53.20 PROCEDURE AND TREATMENT NOT CARRIED OUT BECAUSE OF PATIENT'S DECISION FOR UNSPECIFIED REASONS: ICD-10-CM

## 2021-09-08 DIAGNOSIS — N18.2 CHRONIC KIDNEY DISEASE, STAGE 2 (MILD): ICD-10-CM

## 2021-09-08 DIAGNOSIS — N20.0 CALCULUS OF KIDNEY: ICD-10-CM

## 2021-09-08 DIAGNOSIS — Z87.442 PERSONAL HISTORY OF URINARY CALCULI: ICD-10-CM

## 2021-09-08 DIAGNOSIS — R00.1 BRADYCARDIA, UNSPECIFIED: ICD-10-CM

## 2021-09-08 DIAGNOSIS — I12.9 HYPERTENSIVE CHRONIC KIDNEY DISEASE WITH STAGE 1 THROUGH STAGE 4 CHRONIC KIDNEY DISEASE, OR UNSPECIFIED CHRONIC KIDNEY DISEASE: ICD-10-CM

## 2021-12-16 PROBLEM — N20.0 CALCULUS OF KIDNEY: Chronic | Status: ACTIVE | Noted: 2021-08-25

## 2021-12-16 PROBLEM — I10 ESSENTIAL (PRIMARY) HYPERTENSION: Chronic | Status: ACTIVE | Noted: 2021-08-25

## 2022-01-24 ENCOUNTER — APPOINTMENT (OUTPATIENT)
Dept: INTERNAL MEDICINE | Facility: CLINIC | Age: 67
End: 2022-01-24

## 2023-04-10 NOTE — ED ADULT TRIAGE NOTE - CCCP TRG CHIEF CMPLNT
flank pain
Alert-The patient is alert, awake and responds to voice. The patient is oriented to time, place, and person. The triage nurse is able to obtain subjective information.

## 2023-09-22 ENCOUNTER — NON-APPOINTMENT (OUTPATIENT)
Age: 68
End: 2023-09-22

## 2023-09-26 ENCOUNTER — LABORATORY RESULT (OUTPATIENT)
Age: 68
End: 2023-09-26

## 2023-09-26 ENCOUNTER — APPOINTMENT (OUTPATIENT)
Dept: OTOLARYNGOLOGY | Facility: CLINIC | Age: 68
End: 2023-09-26
Payer: MEDICAID

## 2023-09-26 VITALS — WEIGHT: 198 LBS | HEIGHT: 63 IN | BODY MASS INDEX: 35.08 KG/M2

## 2023-09-26 DIAGNOSIS — E05.90 THYROTOXICOSIS, UNSPECIFIED W/OUT THYROTOXIC CRISIS OR STORM: ICD-10-CM

## 2023-09-26 LAB
T4 FREE SERPL-MCNC: 1 NG/DL
TSH SERPL-ACNC: 1.77 UIU/ML

## 2023-09-26 PROCEDURE — 31575 DIAGNOSTIC LARYNGOSCOPY: CPT

## 2023-09-26 PROCEDURE — 99204 OFFICE O/P NEW MOD 45 MIN: CPT | Mod: 25

## 2023-09-27 LAB — T3FREE SERPL-MCNC: 2.73 PG/ML

## 2023-10-17 ENCOUNTER — LABORATORY RESULT (OUTPATIENT)
Age: 68
End: 2023-10-17

## 2023-10-23 ENCOUNTER — APPOINTMENT (OUTPATIENT)
Dept: OTOLARYNGOLOGY | Facility: CLINIC | Age: 68
End: 2023-10-23
Payer: MEDICAID

## 2023-10-23 VITALS — BODY MASS INDEX: 34.02 KG/M2 | WEIGHT: 192 LBS | HEIGHT: 63 IN

## 2023-10-23 DIAGNOSIS — E04.2 NONTOXIC MULTINODULAR GOITER: ICD-10-CM

## 2023-10-23 DIAGNOSIS — Z86.69 PERSONAL HISTORY OF OTHER DISEASES OF THE NERVOUS SYSTEM AND SENSE ORGANS: ICD-10-CM

## 2023-10-23 PROCEDURE — 69210 REMOVE IMPACTED EAR WAX UNI: CPT

## 2023-10-23 PROCEDURE — 99214 OFFICE O/P EST MOD 30 MIN: CPT | Mod: 25

## 2023-10-23 RX ORDER — HYDROCORTISONE AND ACETIC ACID OTIC 20.75; 10.375 MG/ML; MG/ML
1-2 SOLUTION AURICULAR (OTIC)
Qty: 2 | Refills: 1 | Status: ACTIVE | COMMUNITY
Start: 2023-10-23 | End: 1900-01-01

## 2023-10-30 LAB — EAR NOSE AND THROAT CULTURE: ABNORMAL

## 2023-11-02 ENCOUNTER — APPOINTMENT (OUTPATIENT)
Dept: OTOLARYNGOLOGY | Facility: CLINIC | Age: 68
End: 2023-11-02
Payer: MEDICAID

## 2023-11-02 DIAGNOSIS — H60.311 DIFFUSE OTITIS EXTERNA, RIGHT EAR: ICD-10-CM

## 2023-11-02 PROCEDURE — 92557 COMPREHENSIVE HEARING TEST: CPT

## 2023-11-02 PROCEDURE — 92550 TYMPANOMETRY & REFLEX THRESH: CPT | Mod: 52

## 2023-11-02 PROCEDURE — 69420 INCISION OF EARDRUM: CPT | Mod: RT

## 2023-11-02 PROCEDURE — 99214 OFFICE O/P EST MOD 30 MIN: CPT | Mod: 25

## 2023-11-02 RX ORDER — OFLOXACIN OTIC 3 MG/ML
0.3 SOLUTION AURICULAR (OTIC) TWICE DAILY
Qty: 1 | Refills: 2 | Status: ACTIVE | COMMUNITY
Start: 2023-11-02 | End: 1900-01-01

## 2023-11-27 ENCOUNTER — APPOINTMENT (OUTPATIENT)
Dept: OTOLARYNGOLOGY | Facility: CLINIC | Age: 68
End: 2023-11-27
Payer: MEDICAID

## 2023-11-27 PROCEDURE — 92504 EAR MICROSCOPY EXAMINATION: CPT

## 2023-11-27 PROCEDURE — 99214 OFFICE O/P EST MOD 30 MIN: CPT | Mod: 25

## 2024-01-25 ENCOUNTER — APPOINTMENT (OUTPATIENT)
Dept: OTOLARYNGOLOGY | Facility: CLINIC | Age: 69
End: 2024-01-25
Payer: MEDICAID

## 2024-01-25 DIAGNOSIS — H91.91 UNSPECIFIED HEARING LOSS, RIGHT EAR: ICD-10-CM

## 2024-01-25 DIAGNOSIS — H91.8X3 OTHER SPECIFIED HEARING LOSS, BILATERAL: ICD-10-CM

## 2024-01-25 DIAGNOSIS — H61.23 IMPACTED CERUMEN, BILATERAL: ICD-10-CM

## 2024-01-25 PROCEDURE — 99214 OFFICE O/P EST MOD 30 MIN: CPT | Mod: 25

## 2024-01-25 PROCEDURE — 92550 TYMPANOMETRY & REFLEX THRESH: CPT | Mod: 52

## 2024-01-25 PROCEDURE — 92557 COMPREHENSIVE HEARING TEST: CPT

## 2024-02-20 ENCOUNTER — TRANSCRIPTION ENCOUNTER (OUTPATIENT)
Age: 69
End: 2024-02-20

## 2024-02-21 ENCOUNTER — RESULT REVIEW (OUTPATIENT)
Age: 69
End: 2024-02-21

## 2024-02-21 ENCOUNTER — OUTPATIENT (OUTPATIENT)
Dept: OUTPATIENT SERVICES | Facility: HOSPITAL | Age: 69
LOS: 1 days | End: 2024-02-21
Payer: MEDICAID

## 2024-02-21 DIAGNOSIS — H72.91 UNSPECIFIED PERFORATION OF TYMPANIC MEMBRANE, RIGHT EAR: ICD-10-CM

## 2024-02-21 PROCEDURE — 70480 CT ORBIT/EAR/FOSSA W/O DYE: CPT

## 2024-02-21 PROCEDURE — 70480 CT ORBIT/EAR/FOSSA W/O DYE: CPT | Mod: 26

## 2024-02-22 DIAGNOSIS — H72.91 UNSPECIFIED PERFORATION OF TYMPANIC MEMBRANE, RIGHT EAR: ICD-10-CM

## 2024-02-26 ENCOUNTER — APPOINTMENT (OUTPATIENT)
Dept: OTOLARYNGOLOGY | Facility: CLINIC | Age: 69
End: 2024-02-26
Payer: MEDICAID

## 2024-02-26 DIAGNOSIS — H90.A31 MIXED CONDUCTIVE AND SENSORINEURAL HEARING LOSS, UNILATERAL, RIGHT EAR WITH RESTRICTED HEARING ON THE  CONTRALATERAL SIDE: ICD-10-CM

## 2024-02-26 PROCEDURE — 99214 OFFICE O/P EST MOD 30 MIN: CPT

## 2024-02-26 NOTE — REASON FOR VISIT
[Subsequent Evaluation] : a subsequent evaluation for [FreeTextEntry2] : asymmetric hearing loss and perforation of right tympanic membrane

## 2024-02-26 NOTE — PHYSICAL EXAM
[de-identified] : 15-20% dry perforation right postero-inferior TM [Normal] : mucosa is normal [Midline] : trachea located in midline position

## 2024-02-26 NOTE — DATA REVIEWED
[de-identified] : CT personally reviewed and interpreted demonstrating clear tympanum and mastoid AU

## 2024-02-26 NOTE — ASSESSMENT
[FreeTextEntry1] : Discussed option of observation, in-office myringoplasty vs. tympanoplasty Recommend myringoplasty, will arrange

## 2024-02-26 NOTE — HISTORY OF PRESENT ILLNESS
[de-identified] : 01/25/24:  Patient following up today on decreased hearing of right ear, asymmetric hearing loss, perforation of right tympanic membrane. Patient had a Audiogram done today 01/25/2024. Wife states that she feels like his hearing is getting worse in the right ear. Denies any otalgia and otorrhea. Continues to have "whooshing" noises in the right ear. States that he had US of Thyroid and is seeing an endocrinologist.  [FreeTextEntry1] : Pt completed CT scan on 2/21/24.  No new complaints or symptoms.

## 2024-03-11 ENCOUNTER — APPOINTMENT (OUTPATIENT)
Dept: OTOLARYNGOLOGY | Facility: CLINIC | Age: 69
End: 2024-03-11
Payer: MEDICAID

## 2024-03-11 DIAGNOSIS — H72.91 UNSPECIFIED PERFORATION OF TYMPANIC MEMBRANE, RIGHT EAR: ICD-10-CM

## 2024-03-11 PROCEDURE — 69620 MYRINGOPLASTY: CPT

## 2024-03-11 NOTE — HISTORY OF PRESENT ILLNESS
[FreeTextEntry1] : Patient following up today on decreased hearing of right ear, asymmetric hearing loss, perforation of right tympanic membrane. He states he is here today for a procedure and has no change in symptoms. No further complaints.

## 2024-03-11 NOTE — PROCEDURE
[FreeTextEntry1] : Right gelfoam myringoplasty [FreeTextEntry2] : Right tympanic membrane perforation [FreeTextEntry3] : Consent obtained. 20% inferior perforation visualized. Perforation anesthetized with phenol and margins were postage stamped and freshened using pick and suction. Gelfoam placed in dumbell fashion sealing perforation. Tolerated well.

## 2024-03-11 NOTE — REASON FOR VISIT
[Subsequent Evaluation] : a subsequent evaluation for [FreeTextEntry2] : decreased hearing of right ear, asymmetric hearing loss, perforation of right tympanic membrane.

## 2024-07-11 ENCOUNTER — APPOINTMENT (OUTPATIENT)
Dept: OTOLARYNGOLOGY | Facility: CLINIC | Age: 69
End: 2024-07-11
Payer: MEDICAID

## 2024-07-11 DIAGNOSIS — H61.23 IMPACTED CERUMEN, BILATERAL: ICD-10-CM

## 2024-07-11 DIAGNOSIS — E05.90 THYROTOXICOSIS, UNSPECIFIED W/OUT THYROTOXIC CRISIS OR STORM: ICD-10-CM

## 2024-07-11 DIAGNOSIS — H91.91 UNSPECIFIED HEARING LOSS, RIGHT EAR: ICD-10-CM

## 2024-07-11 DIAGNOSIS — H72.91 UNSPECIFIED PERFORATION OF TYMPANIC MEMBRANE, RIGHT EAR: ICD-10-CM

## 2024-07-11 PROCEDURE — 99213 OFFICE O/P EST LOW 20 MIN: CPT | Mod: 25

## 2024-07-11 PROCEDURE — 69210 REMOVE IMPACTED EAR WAX UNI: CPT

## 2024-07-12 PROBLEM — H72.91 PERFORATION OF RIGHT TYMPANIC MEMBRANE: Status: RESOLVED | Noted: 2023-11-27 | Resolved: 2024-07-12

## 2024-08-15 ENCOUNTER — APPOINTMENT (OUTPATIENT)
Dept: OTOLARYNGOLOGY | Facility: CLINIC | Age: 69
End: 2024-08-15
Payer: MEDICAID

## 2024-08-15 VITALS — BODY MASS INDEX: 33.66 KG/M2 | HEIGHT: 63 IN | WEIGHT: 190 LBS

## 2024-08-15 DIAGNOSIS — E05.90 THYROTOXICOSIS, UNSPECIFIED W/OUT THYROTOXIC CRISIS OR STORM: ICD-10-CM

## 2024-08-15 PROCEDURE — G2211 COMPLEX E/M VISIT ADD ON: CPT | Mod: NC,1L

## 2024-08-15 PROCEDURE — 99214 OFFICE O/P EST MOD 30 MIN: CPT

## 2024-08-15 NOTE — ASSESSMENT
[FreeTextEntry1] : Grave's disease MNG  Plan for total thyroidectomy Records from endocrinologist reviewed  Risks of neck surgery were discussed including unsightly scarring, bleeding, neurovascular injury including CN11 and CN12,  infection, general anesthesia, and need for reoperation Special risks of thyroid surgery were also discussed including recurrent laryngeal nerve injury resulting in either temporary or permanent voice change as well as airway obstruction in the rare case of bilateral nerve injury. Risks of calcium issues, either temporary or permanent, were also discussed

## 2024-08-15 NOTE — REASON FOR VISIT
[Subsequent Evaluation] : a subsequent evaluation for [FreeTextEntry2] : perforation of right tympanic membrane, s/p right gelfoam myringoplasty.

## 2024-08-15 NOTE — HISTORY OF PRESENT ILLNESS
[FreeTextEntry1] : Patient following up today for perforation of right tympanic membrane, s/p right gelfoam myringoplasty. Accompanied by wife to translate [place]ic. Continues to have thumping and echoing from R ear. Hx Graves disease, interested in thyroidectomy. Had labs 6/17/24.

## 2024-11-06 ENCOUNTER — OUTPATIENT (OUTPATIENT)
Dept: OUTPATIENT SERVICES | Facility: HOSPITAL | Age: 69
LOS: 1 days | End: 2024-11-06
Payer: MEDICAID

## 2024-11-06 VITALS
WEIGHT: 190.04 LBS | RESPIRATION RATE: 18 BRPM | HEART RATE: 71 BPM | SYSTOLIC BLOOD PRESSURE: 136 MMHG | OXYGEN SATURATION: 96 % | HEIGHT: 66.93 IN | TEMPERATURE: 98 F | DIASTOLIC BLOOD PRESSURE: 85 MMHG

## 2024-11-06 DIAGNOSIS — E05.90 THYROTOXICOSIS, UNSPECIFIED WITHOUT THYROTOXIC CRISIS OR STORM: ICD-10-CM

## 2024-11-06 DIAGNOSIS — Z01.818 ENCOUNTER FOR OTHER PREPROCEDURAL EXAMINATION: ICD-10-CM

## 2024-11-06 LAB
ALBUMIN SERPL ELPH-MCNC: 4.6 G/DL — SIGNIFICANT CHANGE UP (ref 3.5–5.2)
ALP SERPL-CCNC: 128 U/L — HIGH (ref 30–115)
ALT FLD-CCNC: 18 U/L — SIGNIFICANT CHANGE UP (ref 0–41)
ANION GAP SERPL CALC-SCNC: 15 MMOL/L — HIGH (ref 7–14)
AST SERPL-CCNC: 22 U/L — SIGNIFICANT CHANGE UP (ref 0–41)
BASOPHILS # BLD AUTO: 0.03 K/UL — SIGNIFICANT CHANGE UP (ref 0–0.2)
BASOPHILS NFR BLD AUTO: 0.5 % — SIGNIFICANT CHANGE UP (ref 0–1)
BILIRUB SERPL-MCNC: 0.2 MG/DL — SIGNIFICANT CHANGE UP (ref 0.2–1.2)
BUN SERPL-MCNC: 23 MG/DL — HIGH (ref 10–20)
CALCIUM SERPL-MCNC: 9.5 MG/DL — SIGNIFICANT CHANGE UP (ref 8.4–10.5)
CHLORIDE SERPL-SCNC: 102 MMOL/L — SIGNIFICANT CHANGE UP (ref 98–110)
CO2 SERPL-SCNC: 24 MMOL/L — SIGNIFICANT CHANGE UP (ref 17–32)
CREAT SERPL-MCNC: 0.7 MG/DL — SIGNIFICANT CHANGE UP (ref 0.7–1.5)
EGFR: 100 ML/MIN/1.73M2 — SIGNIFICANT CHANGE UP
EOSINOPHIL # BLD AUTO: 0.04 K/UL — SIGNIFICANT CHANGE UP (ref 0–0.7)
EOSINOPHIL NFR BLD AUTO: 0.7 % — SIGNIFICANT CHANGE UP (ref 0–8)
GLUCOSE SERPL-MCNC: 105 MG/DL — HIGH (ref 70–99)
HCT VFR BLD CALC: 43.1 % — SIGNIFICANT CHANGE UP (ref 42–52)
HGB BLD-MCNC: 14.9 G/DL — SIGNIFICANT CHANGE UP (ref 14–18)
IMM GRANULOCYTES NFR BLD AUTO: 0.2 % — SIGNIFICANT CHANGE UP (ref 0.1–0.3)
LYMPHOCYTES # BLD AUTO: 1.99 K/UL — SIGNIFICANT CHANGE UP (ref 1.2–3.4)
LYMPHOCYTES # BLD AUTO: 34.3 % — SIGNIFICANT CHANGE UP (ref 20.5–51.1)
MCHC RBC-ENTMCNC: 31.7 PG — HIGH (ref 27–31)
MCHC RBC-ENTMCNC: 34.6 G/DL — SIGNIFICANT CHANGE UP (ref 32–37)
MCV RBC AUTO: 91.7 FL — SIGNIFICANT CHANGE UP (ref 80–94)
MONOCYTES # BLD AUTO: 0.37 K/UL — SIGNIFICANT CHANGE UP (ref 0.1–0.6)
MONOCYTES NFR BLD AUTO: 6.4 % — SIGNIFICANT CHANGE UP (ref 1.7–9.3)
NEUTROPHILS # BLD AUTO: 3.37 K/UL — SIGNIFICANT CHANGE UP (ref 1.4–6.5)
NEUTROPHILS NFR BLD AUTO: 57.9 % — SIGNIFICANT CHANGE UP (ref 42.2–75.2)
NRBC # BLD: 0 /100 WBCS — SIGNIFICANT CHANGE UP (ref 0–0)
PLATELET # BLD AUTO: 157 K/UL — SIGNIFICANT CHANGE UP (ref 130–400)
PMV BLD: 13.1 FL — HIGH (ref 7.4–10.4)
POTASSIUM SERPL-MCNC: 3.9 MMOL/L — SIGNIFICANT CHANGE UP (ref 3.5–5)
POTASSIUM SERPL-SCNC: 3.9 MMOL/L — SIGNIFICANT CHANGE UP (ref 3.5–5)
PROT SERPL-MCNC: 7.4 G/DL — SIGNIFICANT CHANGE UP (ref 6–8)
RBC # BLD: 4.7 M/UL — SIGNIFICANT CHANGE UP (ref 4.7–6.1)
RBC # FLD: 12.9 % — SIGNIFICANT CHANGE UP (ref 11.5–14.5)
SODIUM SERPL-SCNC: 141 MMOL/L — SIGNIFICANT CHANGE UP (ref 135–146)
WBC # BLD: 5.81 K/UL — SIGNIFICANT CHANGE UP (ref 4.8–10.8)
WBC # FLD AUTO: 5.81 K/UL — SIGNIFICANT CHANGE UP (ref 4.8–10.8)

## 2024-11-06 PROCEDURE — 85025 COMPLETE CBC W/AUTO DIFF WBC: CPT

## 2024-11-06 PROCEDURE — 99214 OFFICE O/P EST MOD 30 MIN: CPT | Mod: 25

## 2024-11-06 PROCEDURE — 93005 ELECTROCARDIOGRAM TRACING: CPT

## 2024-11-06 PROCEDURE — 93010 ELECTROCARDIOGRAM REPORT: CPT

## 2024-11-06 PROCEDURE — 36415 COLL VENOUS BLD VENIPUNCTURE: CPT

## 2024-11-06 PROCEDURE — 80053 COMPREHEN METABOLIC PANEL: CPT

## 2024-11-06 NOTE — H&P PST ADULT - NSHP PST SURGERY DATE_DT_GEN_A_CORE
[facility].    PATIENT WOULD LIKE TO USE YANELIS SIEGEL) FOR HIS [employer].    PATIENT HAS BEEN SCHEDULED FOR COMPLIANCE F/U.     no difficulties 15-Nov-2024

## 2024-11-06 NOTE — H&P PST ADULT - NSANTHAGERD_ENT_A_CORE
[Myalgias] : myalgias [Back Pain] : back pain [Negative] : Endocrine [Diabetes] : no diabetes [Thyroid Problem] : no thyroid problem Yes

## 2024-11-06 NOTE — H&P PST ADULT - REASON FOR ADMISSION
68 Y/O M WITH PMHX HYPERTHYROID, HTN,  SCHEDULED FOR PAST FOR TOTAL THYROIDECTOMY UNDER [place] WITH DR SHAH ON 11/15/24. PT REPORTS HYPERTHYROIDISM TAKING METHIMAZOLE WITH GROWING THYROID NODULES FOR PAST 2 YEARS.

## 2024-11-06 NOTE — H&P PST ADULT - NSANTHOSAYNRD_GEN_A_CORE
No. FUNMI screening performed.  STOP BANG Legend: 0-2 = LOW Risk; 3-4 = INTERMEDIATE Risk; 5-8 = HIGH Risk

## 2024-11-06 NOTE — H&P PST ADULT - HISTORY OF PRESENT ILLNESS
PATIENT CURRENTLY DENIES CHEST PAIN  SHORTNESS OF BREATH  PALPITATIONS,  DYSURIA, OR UPPER RESPIRATORY INFECTION IN PAST 2 WEEKS      Denies travel outside the [place] in the past 30 days  Patient denies any signs or symptoms of COVID 19 and denies contact with known positive individuals.         Anesthesia Alert  YES--Difficult Airway CLASS IV  NO--History of neck surgery or radiation  NO--Limited ROM of neck  NO--History of Malignant hyperthermia  NO--No personal or family history of Pseudocholinesterase deficiency.  NO--Prior Anesthesia Complication  NO--Latex Allergy  NO--Loose teeth  NO--History of Rheumatoid Arthritis  NO--Bleeding risk  NO--FUNMI  NO--Other_____    DASI 9.89    RCRI 0    PT DENIES ANY RASHES, ABRASION, OR OPEN WOUNDS OR CUTS    AS PER THE PT, THIS IS HIS/HER COMPLETE MEDICAL AND SURGICAL HX, INCLUDING MEDICATIONS PRESCRIBED AND OVER THE COUNTER    Patient/Guardian understands the instructions and was given the opportunity to ask questions and have them answered.    pt denies any suicidal ideation or thoughts, pt states not a threat to self or others

## 2024-11-07 DIAGNOSIS — E05.90 THYROTOXICOSIS, UNSPECIFIED WITHOUT THYROTOXIC CRISIS OR STORM: ICD-10-CM

## 2024-11-07 DIAGNOSIS — Z01.818 ENCOUNTER FOR OTHER PREPROCEDURAL EXAMINATION: ICD-10-CM

## 2024-11-15 ENCOUNTER — INPATIENT (INPATIENT)
Facility: HOSPITAL | Age: 69
LOS: 0 days | Discharge: HOME | DRG: 404 | End: 2024-11-16
Attending: STUDENT IN AN ORGANIZED HEALTH CARE EDUCATION/TRAINING PROGRAM | Admitting: STUDENT IN AN ORGANIZED HEALTH CARE EDUCATION/TRAINING PROGRAM
Payer: MEDICAID

## 2024-11-15 ENCOUNTER — APPOINTMENT (OUTPATIENT)
Dept: OTOLARYNGOLOGY | Facility: HOSPITAL | Age: 69
End: 2024-11-15

## 2024-11-15 VITALS — HEIGHT: 66.93 IN | WEIGHT: 189.6 LBS

## 2024-11-15 DIAGNOSIS — E05.00 THYROTOXICOSIS WITH DIFFUSE GOITER WITHOUT THYROTOXIC CRISIS OR STORM: ICD-10-CM

## 2024-11-15 DIAGNOSIS — I95.9 HYPOTENSION, UNSPECIFIED: ICD-10-CM

## 2024-11-15 DIAGNOSIS — R33.9 RETENTION OF URINE, UNSPECIFIED: ICD-10-CM

## 2024-11-15 DIAGNOSIS — R00.1 BRADYCARDIA, UNSPECIFIED: ICD-10-CM

## 2024-11-15 DIAGNOSIS — E05.90 THYROTOXICOSIS, UNSPECIFIED WITHOUT THYROTOXIC CRISIS OR STORM: ICD-10-CM
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
## 2024-11-15 DIAGNOSIS — I10 ESSENTIAL (PRIMARY) HYPERTENSION: ICD-10-CM

## 2024-11-15 LAB
ANION GAP SERPL CALC-SCNC: 11 MMOL/L — SIGNIFICANT CHANGE UP (ref 7–14)
BUN SERPL-MCNC: 19 MG/DL — SIGNIFICANT CHANGE UP (ref 10–20)
CALCIUM SERPL-MCNC: 8.2 MG/DL — LOW (ref 8.4–10.4)
CALCIUM SERPL-MCNC: 8.9 MG/DL — SIGNIFICANT CHANGE UP (ref 8.4–10.5)
CHLORIDE SERPL-SCNC: 103 MMOL/L — SIGNIFICANT CHANGE UP (ref 98–110)
CO2 SERPL-SCNC: 25 MMOL/L — SIGNIFICANT CHANGE UP (ref 17–32)
CREAT SERPL-MCNC: 0.9 MG/DL — SIGNIFICANT CHANGE UP (ref 0.7–1.5)
EGFR: 93 ML/MIN/1.73M2 — SIGNIFICANT CHANGE UP
EGFR: 93 ML/MIN/1.73M2 — SIGNIFICANT CHANGE UP
GLUCOSE SERPL-MCNC: 138 MG/DL — HIGH (ref 70–99)
HCT VFR BLD CALC: 37.3 % — LOW (ref 42–52)
HGB BLD-MCNC: 12.6 G/DL — LOW (ref 14–18)
MCHC RBC-ENTMCNC: 31.3 PG — HIGH (ref 27–31)
MCHC RBC-ENTMCNC: 33.8 G/DL — SIGNIFICANT CHANGE UP (ref 32–37)
MCV RBC AUTO: 92.8 FL — SIGNIFICANT CHANGE UP (ref 80–94)
NRBC # BLD: 0 /100 WBCS — SIGNIFICANT CHANGE UP (ref 0–0)
NRBC BLD-RTO: 0 /100 WBCS — SIGNIFICANT CHANGE UP (ref 0–0)
PLATELET # BLD AUTO: 141 K/UL — SIGNIFICANT CHANGE UP (ref 130–400)
PMV BLD: 11.7 FL — HIGH (ref 7.4–10.4)
POTASSIUM SERPL-MCNC: 4 MMOL/L — SIGNIFICANT CHANGE UP (ref 3.5–5)
POTASSIUM SERPL-SCNC: 4 MMOL/L — SIGNIFICANT CHANGE UP (ref 3.5–5)
PTH-INTACT IO % DIF SERPL: 49.2 PG/ML — SIGNIFICANT CHANGE UP (ref 19–73)
RBC # BLD: 4.02 M/UL — LOW (ref 4.7–6.1)
RBC # FLD: 13.1 % — SIGNIFICANT CHANGE UP (ref 11.5–14.5)
SODIUM SERPL-SCNC: 139 MMOL/L — SIGNIFICANT CHANGE UP (ref 135–146)
WBC # BLD: 13.17 K/UL — HIGH (ref 4.8–10.8)
WBC # FLD AUTO: 13.17 K/UL — HIGH (ref 4.8–10.8)

## 2024-11-15 PROCEDURE — 86850 RBC ANTIBODY SCREEN: CPT

## 2024-11-15 PROCEDURE — 86901 BLOOD TYPING SEROLOGIC RH(D): CPT

## 2024-11-15 PROCEDURE — 85027 COMPLETE CBC AUTOMATED: CPT

## 2024-11-15 PROCEDURE — 80048 BASIC METABOLIC PNL TOTAL CA: CPT

## 2024-11-15 PROCEDURE — C1889: CPT

## 2024-11-15 PROCEDURE — C9399: CPT

## 2024-11-15 PROCEDURE — 60240 REMOVAL OF THYROID: CPT

## 2024-11-15 PROCEDURE — 88307 TISSUE EXAM BY PATHOLOGIST: CPT | Mod: 26

## 2024-11-15 PROCEDURE — 36415 COLL VENOUS BLD VENIPUNCTURE: CPT

## 2024-11-15 PROCEDURE — 21501 I&D DP ABSC/HMTMA SFT TS NCK: CPT

## 2024-11-15 PROCEDURE — 86900 BLOOD TYPING SEROLOGIC ABO: CPT

## 2024-11-15 RX ORDER — AMLODIPINE BESYLATE 10 MG
1 TABLET ORAL
Refills: 0 | DISCHARGE

## 2024-11-15 RX ORDER — CALCITRIOL 0.5 UG/1
0.25 CAPSULE, GELATIN COATED ORAL EVERY 12 HOURS
Refills: 0 | Status: DISCONTINUED | OUTPATIENT
Start: 2024-11-15 | End: 2024-11-16

## 2024-11-15 RX ORDER — LEVOTHYROXINE SODIUM 300 MCG
100 TABLET ORAL DAILY
Refills: 0 | Status: DISCONTINUED | OUTPATIENT
Start: 2024-11-16 | End: 2024-11-16

## 2024-11-15 RX ORDER — ACETAMINOPHEN 500 MG/5ML
650 LIQUID (ML) ORAL EVERY 6 HOURS
Refills: 0 | Status: DISCONTINUED | OUTPATIENT
Start: 2024-11-15 | End: 2024-11-16

## 2024-11-15 RX ORDER — CALCIUM CARBONATE 750 MG/1
2 TABLET ORAL THREE TIMES A DAY
Refills: 0 | Status: DISCONTINUED | OUTPATIENT
Start: 2024-11-15 | End: 2024-11-16

## 2024-11-15 RX ORDER — VALSARTAN 160 MG/1
1 TABLET ORAL
Refills: 0 | DISCHARGE

## 2024-11-15 RX ORDER — OXYCODONE HYDROCHLORIDE AND ACETAMINOPHEN 10; 325 MG/1; MG/1
1 TABLET ORAL
Qty: 12 | Refills: 0
Start: 2024-11-15 | End: 2024-11-17

## 2024-11-15 RX ORDER — CALCITRIOL 0.5 UG/1
1 CAPSULE, GELATIN COATED ORAL
Qty: 60 | Refills: 0
Start: 2024-11-15 | End: 2024-12-14

## 2024-11-15 RX ORDER — OXYCODONE HYDROCHLORIDE AND ACETAMINOPHEN 10; 325 MG/1; MG/1
1 TABLET ORAL EVERY 6 HOURS
Refills: 0 | Status: DISCONTINUED | OUTPATIENT
Start: 2024-11-15 | End: 2024-11-16

## 2024-11-15 RX ORDER — METHIMAZOLE 5 MG/1
1 TABLET ORAL
Refills: 0 | DISCHARGE

## 2024-11-15 RX ORDER — CALCIUM CARBONATE 750 MG/1
2 TABLET ORAL
Qty: 180 | Refills: 0
Start: 2024-11-15 | End: 2024-12-14

## 2024-11-15 RX ORDER — ACETAMINOPHEN 500 MG/5ML
1000 LIQUID (ML) ORAL EVERY 6 HOURS
Refills: 0 | Status: COMPLETED | OUTPATIENT
Start: 2024-11-15 | End: 2024-11-15

## 2024-11-15 RX ORDER — CEFAZOLIN SODIUM IN 0.9 % NACL 3 G/100 ML
1000 INTRAVENOUS SOLUTION, PIGGYBACK (ML) INTRAVENOUS EVERY 8 HOURS
Refills: 0 | Status: DISCONTINUED | OUTPATIENT
Start: 2024-11-15 | End: 2024-11-16

## 2024-11-15 RX ORDER — INFLUENZA A VIRUS A/IDAHO/07/2018 (H1N1) ANTIGEN (MDCK CELL DERIVED, PROPIOLACTONE INACTIVATED, INFLUENZA A VIRUS A/INDIANA/08/2018 (H3N2) ANTIGEN (MDCK CELL DERIVED, PROPIOLACTONE INACTIVATED), INFLUENZA B VIRUS B/SINGAPORE/INFTT-16-0610/2016 ANTIGEN (MDCK CELL DERIVED, PROPIOLACTONE INACTIVATED), INFLUENZA B VIRUS B/IOWA/06/2017 ANTIGEN (MDCK CELL DERIVED, PROPIOLACTONE INACTIVATED) 15; 15; 15; 15 UG/.5ML; UG/.5ML; UG/.5ML; UG/.5ML
0.5 INJECTION, SUSPENSION INTRAMUSCULAR ONCE
Refills: 0 | Status: DISCONTINUED | OUTPATIENT
Start: 2024-11-15 | End: 2024-11-16

## 2024-11-15 RX ORDER — HYDROMORPHONE/SOD CHLOR,ISO/PF 2 MG/10 ML
0.5 SYRINGE (ML) INJECTION ONCE
Refills: 0 | Status: DISCONTINUED | OUTPATIENT
Start: 2024-11-15 | End: 2024-11-15

## 2024-11-15 RX ORDER — AMLODIPINE BESYLATE 10 MG/1
5 TABLET ORAL DAILY
Refills: 0 | Status: DISCONTINUED | OUTPATIENT
Start: 2024-11-15 | End: 2024-11-16

## 2024-11-15 RX ADMIN — CALCITRIOL 0.25 MICROGRAM(S): 0.5 CAPSULE, GELATIN COATED ORAL at 17:30

## 2024-11-15 RX ADMIN — Medication 100 MILLIGRAM(S): at 22:33

## 2024-11-15 RX ADMIN — Medication 0.5 MILLIGRAM(S): at 11:00

## 2024-11-15 RX ADMIN — CALCIUM CARBONATE 2 TABLET(S): 750 TABLET ORAL at 22:33

## 2024-11-15 RX ADMIN — Medication 1000 MILLIGRAM(S): at 07:20

## 2024-11-15 RX ADMIN — CALCIUM CARBONATE 2 TABLET(S): 750 TABLET ORAL at 17:25

## 2024-11-15 RX ADMIN — Medication 0.5 MILLIGRAM(S): at 10:30

## 2024-11-15 RX ADMIN — Medication 3 MILLILITER(S): at 14:00

## 2024-11-16 ENCOUNTER — TRANSCRIPTION ENCOUNTER (OUTPATIENT)
Age: 69
End: 2024-11-16

## 2024-11-16 VITALS
RESPIRATION RATE: 18 BRPM | OXYGEN SATURATION: 96 % | HEART RATE: 94 BPM | SYSTOLIC BLOOD PRESSURE: 129 MMHG | DIASTOLIC BLOOD PRESSURE: 73 MMHG | TEMPERATURE: 99 F

## 2024-11-16 LAB
ANION GAP SERPL CALC-SCNC: 10 MMOL/L — SIGNIFICANT CHANGE UP (ref 7–14)
BUN SERPL-MCNC: 16 MG/DL — SIGNIFICANT CHANGE UP (ref 10–20)
CALCIUM SERPL-MCNC: 9.9 MG/DL — SIGNIFICANT CHANGE UP (ref 8.4–10.5)
CHLORIDE SERPL-SCNC: 103 MMOL/L — SIGNIFICANT CHANGE UP (ref 98–110)
CO2 SERPL-SCNC: 27 MMOL/L — SIGNIFICANT CHANGE UP (ref 17–32)
CREAT SERPL-MCNC: 0.8 MG/DL — SIGNIFICANT CHANGE UP (ref 0.7–1.5)
EGFR: 96 ML/MIN/1.73M2 — SIGNIFICANT CHANGE UP
EGFR: 96 ML/MIN/1.73M2 — SIGNIFICANT CHANGE UP
GLUCOSE SERPL-MCNC: 115 MG/DL — HIGH (ref 70–99)
HCT VFR BLD CALC: 37.6 % — LOW (ref 42–52)
HGB BLD-MCNC: 12.7 G/DL — LOW (ref 14–18)
MCHC RBC-ENTMCNC: 31.6 PG — HIGH (ref 27–31)
MCHC RBC-ENTMCNC: 33.8 G/DL — SIGNIFICANT CHANGE UP (ref 32–37)
MCV RBC AUTO: 93.5 FL — SIGNIFICANT CHANGE UP (ref 80–94)
NRBC # BLD: 0 /100 WBCS — SIGNIFICANT CHANGE UP (ref 0–0)
NRBC BLD-RTO: 0 /100 WBCS — SIGNIFICANT CHANGE UP (ref 0–0)
PLATELET # BLD AUTO: 165 K/UL — SIGNIFICANT CHANGE UP (ref 130–400)
PMV BLD: 11.5 FL — HIGH (ref 7.4–10.4)
POTASSIUM SERPL-MCNC: 4 MMOL/L — SIGNIFICANT CHANGE UP (ref 3.5–5)
POTASSIUM SERPL-SCNC: 4 MMOL/L — SIGNIFICANT CHANGE UP (ref 3.5–5)
RBC # BLD: 4.02 M/UL — LOW (ref 4.7–6.1)
RBC # FLD: 13.1 % — SIGNIFICANT CHANGE UP (ref 11.5–14.5)
SODIUM SERPL-SCNC: 140 MMOL/L — SIGNIFICANT CHANGE UP (ref 135–146)
WBC # BLD: 10.86 K/UL — HIGH (ref 4.8–10.8)
WBC # FLD AUTO: 10.86 K/UL — HIGH (ref 4.8–10.8)

## 2024-11-16 RX ORDER — LEVOTHYROXINE SODIUM 300 MCG
1 TABLET ORAL
Qty: 30 | Refills: 0
Start: 2024-11-16 | End: 2024-12-15

## 2024-11-16 RX ORDER — CEPHALEXIN 250 MG/1
1 CAPSULE ORAL
Qty: 10 | Refills: 0
Start: 2024-11-16 | End: 2024-11-20

## 2024-11-16 RX ORDER — ACETAMINOPHEN 500 MG/5ML
2 LIQUID (ML) ORAL
Qty: 0 | Refills: 0 | DISCHARGE
Start: 2024-11-16

## 2024-11-16 RX ADMIN — AMLODIPINE BESYLATE 5 MILLIGRAM(S): 10 TABLET ORAL at 05:10

## 2024-11-16 RX ADMIN — Medication 100 MILLIGRAM(S): at 05:13

## 2024-11-16 RX ADMIN — CALCITRIOL 0.25 MICROGRAM(S): 0.5 CAPSULE, GELATIN COATED ORAL at 05:11

## 2024-11-16 RX ADMIN — Medication 100 MICROGRAM(S): at 05:13

## 2024-11-16 RX ADMIN — CALCIUM CARBONATE 2 TABLET(S): 750 TABLET ORAL at 05:11

## 2024-11-16 RX ADMIN — Medication 160 MILLIGRAM(S): at 05:13

## 2024-11-16 RX ADMIN — CALCIUM CARBONATE 2 TABLET(S): 750 TABLET ORAL at 12:45

## 2024-11-16 RX ADMIN — Medication 100 MILLIGRAM(S): at 14:53

## 2024-11-18 ENCOUNTER — APPOINTMENT (OUTPATIENT)
Dept: OTOLARYNGOLOGY | Facility: CLINIC | Age: 69
End: 2024-11-18
Payer: MEDICAID

## 2024-11-18 DIAGNOSIS — E04.2 NONTOXIC MULTINODULAR GOITER: ICD-10-CM

## 2024-11-18 DIAGNOSIS — Z86.39 PERSONAL HISTORY OF OTHER ENDOCRINE, NUTRITIONAL AND METABOLIC DISEASE: ICD-10-CM

## 2024-11-18 DIAGNOSIS — E89.0 POSTPROCEDURAL HYPOTHYROIDISM: ICD-10-CM

## 2024-11-18 PROCEDURE — 99024 POSTOP FOLLOW-UP VISIT: CPT

## 2024-11-19 LAB
CALCIUM SERPL-MCNC: 10.1 MG/DL
CALCIUM SERPL-MCNC: 9.9 MG/DL
PARATHYROID HORMONE INTACT: 17 PG/ML

## 2024-12-02 ENCOUNTER — APPOINTMENT (OUTPATIENT)
Dept: OTOLARYNGOLOGY | Facility: CLINIC | Age: 69
End: 2024-12-02
Payer: MEDICAID

## 2024-12-02 DIAGNOSIS — E89.0 POSTPROCEDURAL HYPOTHYROIDISM: ICD-10-CM

## 2024-12-02 PROCEDURE — 99024 POSTOP FOLLOW-UP VISIT: CPT

## 2024-12-04 LAB
25(OH)D3 SERPL-MCNC: 20 NG/ML
CALCIUM SERPL-MCNC: 9.7 MG/DL
CALCIUM SERPL-MCNC: 9.8 MG/DL
PARATHYROID HORMONE INTACT: 24 PG/ML
SURGICAL PATHOLOGY STUDY: SIGNIFICANT CHANGE UP
TSH SERPL-ACNC: 3.31 UIU/ML

## 2025-03-03 ENCOUNTER — APPOINTMENT (OUTPATIENT)
Dept: OTOLARYNGOLOGY | Facility: CLINIC | Age: 70
End: 2025-03-03
Payer: MEDICAID

## 2025-03-03 ENCOUNTER — NON-APPOINTMENT (OUTPATIENT)
Age: 70
End: 2025-03-03

## 2025-03-03 VITALS — BODY MASS INDEX: 36.8 KG/M2 | WEIGHT: 200 LBS | HEIGHT: 62 IN

## 2025-03-03 DIAGNOSIS — R49.0 DYSPHONIA: ICD-10-CM

## 2025-03-03 DIAGNOSIS — Z90.89 OTHER SPECIFIED POSTPROCEDURAL STATES: ICD-10-CM

## 2025-03-03 DIAGNOSIS — Z98.890 OTHER SPECIFIED POSTPROCEDURAL STATES: ICD-10-CM

## 2025-03-03 DIAGNOSIS — M54.2 CERVICALGIA: ICD-10-CM

## 2025-03-03 PROCEDURE — 99213 OFFICE O/P EST LOW 20 MIN: CPT | Mod: 25

## 2025-03-03 PROCEDURE — 31575 DIAGNOSTIC LARYNGOSCOPY: CPT
